# Patient Record
Sex: MALE | Race: WHITE
[De-identification: names, ages, dates, MRNs, and addresses within clinical notes are randomized per-mention and may not be internally consistent; named-entity substitution may affect disease eponyms.]

---

## 2022-11-29 ENCOUNTER — HOSPITAL ENCOUNTER (OUTPATIENT)
Dept: HOSPITAL 75 - CSD | Age: 67
Setting detail: OBSERVATION
LOS: 4 days | Discharge: HOME | End: 2022-12-03
Attending: INTERNAL MEDICINE | Admitting: INTERNAL MEDICINE
Payer: MEDICARE

## 2022-11-29 VITALS — HEIGHT: 74.8 IN | BODY MASS INDEX: 28.39 KG/M2 | WEIGHT: 225.97 LBS

## 2022-11-29 DIAGNOSIS — I21.4: ICD-10-CM

## 2022-11-29 DIAGNOSIS — I25.10: Primary | ICD-10-CM

## 2022-11-29 DIAGNOSIS — E78.5: ICD-10-CM

## 2022-11-29 DIAGNOSIS — U07.1: ICD-10-CM

## 2022-11-29 DIAGNOSIS — I47.1: ICD-10-CM

## 2022-11-29 DIAGNOSIS — I10: ICD-10-CM

## 2022-11-29 PROCEDURE — 80061 LIPID PANEL: CPT

## 2022-11-29 PROCEDURE — 84484 ASSAY OF TROPONIN QUANT: CPT

## 2022-11-29 PROCEDURE — 85025 COMPLETE CBC W/AUTO DIFF WBC: CPT

## 2022-11-29 PROCEDURE — 80053 COMPREHEN METABOLIC PANEL: CPT

## 2022-11-29 PROCEDURE — 93306 TTE W/DOPPLER COMPLETE: CPT

## 2022-11-29 PROCEDURE — 80048 BASIC METABOLIC PNL TOTAL CA: CPT

## 2022-11-29 PROCEDURE — 84443 ASSAY THYROID STIM HORMONE: CPT

## 2022-11-29 PROCEDURE — 36415 COLL VENOUS BLD VENIPUNCTURE: CPT

## 2022-11-29 PROCEDURE — 85027 COMPLETE CBC AUTOMATED: CPT

## 2022-11-29 PROCEDURE — 93458 L HRT ARTERY/VENTRICLE ANGIO: CPT

## 2022-11-29 PROCEDURE — 96361 HYDRATE IV INFUSION ADD-ON: CPT

## 2022-11-29 PROCEDURE — 83735 ASSAY OF MAGNESIUM: CPT

## 2022-11-29 PROCEDURE — 93005 ELECTROCARDIOGRAM TRACING: CPT

## 2022-11-30 VITALS — DIASTOLIC BLOOD PRESSURE: 73 MMHG | SYSTOLIC BLOOD PRESSURE: 124 MMHG

## 2022-11-30 VITALS — DIASTOLIC BLOOD PRESSURE: 80 MMHG | SYSTOLIC BLOOD PRESSURE: 123 MMHG

## 2022-11-30 VITALS — SYSTOLIC BLOOD PRESSURE: 139 MMHG | DIASTOLIC BLOOD PRESSURE: 77 MMHG

## 2022-11-30 VITALS — DIASTOLIC BLOOD PRESSURE: 77 MMHG | SYSTOLIC BLOOD PRESSURE: 121 MMHG

## 2022-11-30 LAB
ALBUMIN SERPL-MCNC: 3.8 GM/DL (ref 3.2–4.5)
ALP SERPL-CCNC: 76 U/L (ref 40–136)
ALT SERPL-CCNC: 21 U/L (ref 0–55)
BILIRUB SERPL-MCNC: 0.9 MG/DL (ref 0.1–1)
BUN/CREAT SERPL: 22
CALCIUM SERPL-MCNC: 8.9 MG/DL (ref 8.5–10.1)
CHLORIDE SERPL-SCNC: 112 MMOL/L (ref 98–107)
CHOLEST SERPL-MCNC: 122 MG/DL (ref ?–200)
CO2 SERPL-SCNC: 18 MMOL/L (ref 21–32)
CREAT SERPL-MCNC: 0.91 MG/DL (ref 0.6–1.3)
GFR SERPLBLD BASED ON 1.73 SQ M-ARVRAT: 92 ML/MIN
GLUCOSE SERPL-MCNC: 117 MG/DL (ref 70–105)
HCT VFR BLD CALC: 48 % (ref 40–54)
HDLC SERPL-MCNC: 27 MG/DL (ref 40–60)
HGB BLD-MCNC: 15.8 G/DL (ref 13.3–17.7)
MAGNESIUM SERPL-MCNC: 1.9 MG/DL (ref 1.6–2.4)
MCH RBC QN AUTO: 31 PG (ref 25–34)
MCHC RBC AUTO-ENTMCNC: 33 G/DL (ref 32–36)
MCV RBC AUTO: 94 FL (ref 80–99)
PLATELET # BLD: 183 10^3/UL (ref 130–400)
PMV BLD AUTO: 10.9 FL (ref 9–12.2)
POTASSIUM SERPL-SCNC: 4 MMOL/L (ref 3.6–5)
PROT SERPL-MCNC: 6.1 GM/DL (ref 6.4–8.2)
SODIUM SERPL-SCNC: 144 MMOL/L (ref 135–145)
TRIGL SERPL-MCNC: 157 MG/DL (ref ?–150)
VLDLC SERPL CALC-MCNC: 31 MG/DL (ref 5–40)
WBC # BLD AUTO: 12.7 10^3/UL (ref 4.3–11)

## 2022-11-30 RX ADMIN — APIXABAN SCH MG: 5 TABLET, FILM COATED ORAL at 20:58

## 2022-11-30 RX ADMIN — NIRMATRELVIR AND RITONAVIR SCH EACH: KIT at 20:58

## 2022-11-30 NOTE — CONSULTATION-CARDIOLOGY
HPI-Cardiology


Cardiology Consultation:


Date of Consultation


11/30/22


Time Seen by a Provider:  13:00


Date of Admission





Attending Physician


Wesly Juares DO


Admitting Physician


Admitting Physician:


Nile Herrmann MD 








Attending Physician:


Nile Herrmann MD


Consulting Physician


DILLAN GRUBER MD, MA, FACP, FACC, [facility], CCDS





HPI:


Chief Complaint:


Syncope 


Atrial tachycardia vs atrial flutter with 2:1 conduction


Mr. Jennings is a 67 yr old male admitted to [facility] from [facility] d/t syncopal 

episodes x 2 at home.  He reports he got up yesterday to use the bathroom.  He 

states he was having palpitations and the next thing he recalls he had passed 

out hitting his head on the wall.  He reports he came to when he hit the floor. 

He reports he then decided to go sit down once he got up.  He was still having 

palpitations and he again passed out while sitting coming to when he hit the 

floor.  He does not report any loss of bowel or bladder control.  He states 

after he came to the second time he could still feel his heart racing. He denies

any CP or SOB.  He denies having any LE swelling.  He states in the ED at [facility] 

they found his HR to be in the 130's-140's.  He states he received a medication 

in the ED and the palpitations resolved and have not returned.  He states he has

not had any n/v/d.  No c/o fever or chills.  He reports he was swabbed on his 

way out the door from [place] to be transferred here and found to be COVID 

positive.





Review of Systems-Cardiology


Review of Systems


Constitutional:  No chills, No fever, No malaise


Eyes:  No vision change


Ears/Nose/Throat:  No epistaxis, No recent hearing loss, No ulcerations


Respiratory:  As described under HPI


Cardiovascular:  As described under HPI


Gastrointestinal:  No constipation, No diarrhea, No nausea, No vomiting


Genitourinary:  No dysuria, No hematuria


Musculoskeletal:  no symptoms reported


Skin:  No rash on exposed areas, No ulcerations on exposed areas


Psychiatric/Neurological:  syncope; No anxiety, No depression, No seizure, No 

focal weakness


Hematologic:  No bleeding abnormalities





PMH-Social-Family Hx


Patient Social History


Marrital Status:  


Have you traveled recently?:  No


Alcohol Use?:  No


Pt feels they are or have been:  No





Past Medical History


PMH


As described under Assessment.





Family Medical History


Family Medical History:  


He reports his father had "heart trouble", exact details unknown other


than he had a pacemaker.





Allergies and Home Medications


Allergies


Coded Allergies:  


     No Known Drug Allergies (Unverified , 11/30/22)





Patient Home Medication List


Home Medication List Reviewed:  Yes


Aspirin (Aspirin EC) 81 Mg Tablet.dr, 81 MG PO DAILY, (Reported)


   Entered as Reported by: WU MORA on 11/30/22 1325


   Last Action: Held


Carvedilol (Carvedilol) 12.5 Mg Tablet, 12.5 MG PO BID, (Reported)


   Entered as Reported by: WU MORA on 11/30/22 1325


   Last Action: Held


Lisinopril (Lisinopril) 10 Mg Tablet, 10 MG PO DAILY, (Reported)


   Entered as Reported by: WU MORA on 11/30/22 1325


   Last Action: Held


Multivitamin (Multivitamin) 1 Each Tablet, 1 EACH PO DAILY, (Reported)


   Entered as Reported by: WU MORA on 11/30/22 1325


   Last Action: Converted


Simvastatin (Simvastatin) 40 Mg Tablet, 40 MG PO HS, (Reported)


   Entered as Reported by: WU MORA on 11/30/22 1325


   Last Action: Converted


Tamsulosin HCl (Flomax) 0.4 Mg Cap, 0.4 MG PO HS, (Reported)


   Entered as Reported by: WU MORA on 11/30/22 1325


   Last Action: Continued





Physical Exam-Cardiology


Physical Exam


Vital Signs/I&O











 11/30/22 11/30/22 11/30/22 11/30/22





 07:00 08:00 08:00 11:50


 


Temp   36.3 36.1


 


Pulse 60  60 64


 


Resp   18 16


 


B/P (MAP)   121/77 (92) 139/77 (97)


 


Pulse Ox   95 96


 


O2 Delivery  Room Air Room Air Room Air


 


    





 11/30/22 11/30/22  





 13:00 16:12  


 


Temp  36.7  


 


Pulse 69 58  


 


Resp  18  


 


B/P (MAP)  124/73 (90)  


 


Pulse Ox  97  


 


O2 Delivery  Room Air  





Capillary Refill :


Constitutional:  AAO x 3, well-developed, well-nourished


HEENT:  PERRL, hearing is well preserved, oral hygience is good


Neck:  No carotid bruit; carotid pulses are 2 + bilaterally


Respiratory:  No accessory muscle use, No respiratory distress; chest expansion 

is symmetric, chest is bilaterally symmetric, lungs clear to auscultation


Cardiovascular:  regular rate-rhythm; No JVD; S1 and S2


Gastrointestinal:  No tender; soft, round, audible bowel sounds


Extremities:  no lower extremity edema bilateral


Neurologic/Psychiatric:  grossly intact (moves all extremities)


Skin:  No rash on exposed areas, No ulcerations on exposed areas





Data Review


Labs


Laboratory Tests


11/30/22 07:20: 


White Blood Count 12.7H, Red Blood Count 5.11, Hemoglobin 15.8, Hematocrit 48, 

Mean Corpuscular Volume 94, Mean Corpuscular Hemoglobin 31, Mean Corpuscular 

Hemoglobin Concent 33, Red Cell Distribution Width 12.9, Platelet Count 183, 

Mean Platelet Volume 10.9, Sodium Level 144, Potassium Level 4.0, Chloride Level

112H, Carbon Dioxide Level 18L, Anion Gap 14, Blood Urea Nitrogen 20H, 

Creatinine 0.91, Estimat Glomerular Filtration Rate 92, BUN/Creatinine Ratio 22,

Glucose Level 117H, Calcium Level 8.9, Corrected Calcium 9.1, Magnesium Level 

1.9, Total Bilirubin 0.9, Aspartate Amino Transf (AST/SGOT) 23, Alanine 

Aminotransferase (ALT/SGPT) 21, Alkaline Phosphatase 76, Troponin I 0.095H, 

Total Protein 6.1L, Albumin 3.8, Triglycerides Level 157H, Cholesterol Level 

122, LDL Cholesterol Direct 72, VLDL Cholesterol 31, HDL Cholesterol 27L, 

Thyroid Stimulating Hormone (TSH) 2.61


11/30/22 14:35: Troponin I 0.057H








A/P-Cardiology


Assessment/Admission Diagnosis


Syncope


- undetermined etiology: arrhythmia vs postural hypotension (during acute Covid)

on 11/29/22





Atrial tachycardia vs atrial flutter with 2:1 AV conduction of EKG of 11-29-22 

at [facility]





Minimal troponin elevation


- likely Type 2 MI secondary to tachycardia





CAD


- h/o stents x 1 at [facility] by Dr. Méndez approx 15 yrs ago





HTN





HLD


- statin tx





Discussion and Recomendations





Syncope x 2 of undetermined etiology


- keep on tele


- consider implant of ILR as out pt if no cause is found on tele


Atrial tachycardia vs atrial flutter with 2:1 AV conduction


- start on OAC for stroke prophylaxis


- start on Cardizem CD 240mg daily


Minimal troponin elevation


- likely Type 2 MI secondary to tachycardia


EKG today


Echocardiogram today


Lab


Replace electrolytes as indicated


Request records


Start ASA 81 mg daily d/t known h/o CAD with stent in the past


Further recs will be based on his hospital course


I had a long and detailed discussion with him regarding his CV issues and our 

management plan. I answered his questions in detail











DILLAN GRUBER MD FACP FAC CCDS   Nov 30, 2022 17:10

## 2022-11-30 NOTE — CONSULTATION-CARDIOLOGY
HPI-Cardiology


Cardiology Consultation:


Date of Consultation


11/30/22


Time Seen by a Provider:  09:00


Date of Admission


11-30-22


Attending Physician


Wesly Juares DO


Admitting Physician


Admitting Physician:


Nile Herrmann MD 








Attending Physician:


Nile Herrmann MD


Consulting Physician


ADRIAN CHANCE





HPI:


Chief Complaint:


Syncope 


Atrial tachycardia vs atrial flutter with 2:1 conduction


Mr. Jennings is a 67 yr old male admitted to [facility] from [facility] d/t syncopal 

episodes x 2 at home.  He reports he got up yesterday to use the bathroom.  He 

states he was having palpitations and the next thing he recalls he had passed 

out hitting his head on the wall.  He reports he came to when he hit the floor. 

He reports he then decided to go sit down once he got up.  He was still having 

palpitations and he again passed out while sitting coming to when he hit the 

floor.  He does not report any loss of bowel or bladder control.  He states 

after he came to the second time he could still feel his heart racing. He denies

any CP or SOB.  He denies having any LE swelling.  He states in the ED at [facility] 

they found his HR to be in the 130's-140's.  He states he received a medication 

in the ED and the palpitations resolved and have not returned.  He states he has

not had any n/v/d.  No c/o fever or chills.  He reports he was swabbed on his 

way out the door from [place] to be transferred here and found to be COVID 

positive.





Review of Systems-Cardiology


Review of Systems


Constitutional:  No chills, No fever, No malaise


Eyes:  No vision change


Ears/Nose/Throat:  No epistaxis, No recent hearing loss, No ulcerations


Respiratory:  As described under HPI


Cardiovascular:  As described under HPI


Gastrointestinal:  No constipation, No diarrhea, No nausea, No vomiting


Genitourinary:  No dysuria, No hematuria


Musculoskeletal:  no symptoms reported


Skin:  No rash on exposed areas, No ulcerations on exposed areas


Psychiatric/Neurological:  syncope; No anxiety, No depression, No seizure, No 

focal weakness


Hematologic:  No bleeding abnormalities





PMH-Social-Family Hx


Patient Social History


Have you traveled recently?:  No


Alcohol Use?:  No


Pt feels they are or have been:  No





Past Medical History


PMH


As described under Assessment.





Family Medical History


Family Medical History:  


He reports his father had "heart trouble", exact details unknown other


than he had a pacemaker.





Allergies and Home Medications


Allergies


Coded Allergies:  


     No Known Drug Allergies (Unverified , 11/30/22)





Patient Home Medication List


Aspirin (Aspirin EC) 81 Mg Tablet.dr, 81 MG PO DAILY, (Reported)


   Entered as Reported by: WU MORA on 11/30/22 1325


   Last Action: Held


Carvedilol (Carvedilol) 12.5 Mg Tablet, 12.5 MG PO BID, (Reported)


   Entered as Reported by: WU MORA on 11/30/22 1325


   Last Action: Held


Lisinopril (Lisinopril) 10 Mg Tablet, 10 MG PO DAILY, (Reported)


   Entered as Reported by: WU MORA on 11/30/22 1325


   Last Action: Held


Multivitamin (Multivitamin) 1 Each Tablet, 1 EACH PO DAILY, (Reported)


   Entered as Reported by: WU MORA on 11/30/22 1325


   Last Action: Converted


Simvastatin (Simvastatin) 40 Mg Tablet, 40 MG PO HS, (Reported)


   Entered as Reported by: WU MORA on 11/30/22 1325


   Last Action: Converted


Tamsulosin HCl (Flomax) 0.4 Mg Cap, 0.4 MG PO HS, (Reported)


   Entered as Reported by: WU MORA on 11/30/22 1325


   Last Action: Continued





Physical Exam-Cardiology


Physical Exam


Vital Signs/I&O











 12/1/22 12/1/22 12/1/22 12/1/22





 00:00 01:00 04:14 07:00


 


Temp 37.0  36.3 


 


Pulse 59 50 63 50


 


Resp 15  17 


 


B/P (MAP) 117/76 (90)  124/75 (91) 


 


Pulse Ox 97  98 


 


O2 Delivery Room Air  Room Air 


 


    





 12/1/22   





 08:00   


 


Temp 36.2   


 


Pulse 69   


 


Resp 16   


 


B/P (MAP) 125/79 (94)   


 


Pulse Ox 97   


 


O2 Delivery Room Air   














 12/1/22





 00:00


 


Intake Total 730 ml


 


Balance 730 ml





Capillary Refill :


Constitutional:  AAO x 3, well-developed, well-nourished


HEENT:  PERRL, hearing is well preserved, oral hygience is good


Neck:  No carotid bruit; carotid pulses are 2 + bilaterally


Respiratory:  No accessory muscle use, No respiratory distress; chest expansion 

is symmetric, chest is bilaterally symmetric, lungs clear to auscultation


Cardiovascular:  regular rate-rhythm; No JVD; S1 and S2


Gastrointestinal:  No tender; soft, round, audible bowel sounds


Extremities:  no lower extremity edema bilateral


Neurologic/Psychiatric:  grossly intact (moves all extremities)


Skin:  No rash on exposed areas, No ulcerations on exposed areas





Data Review


Labs


Laboratory Tests


11/30/22 14:35: Troponin I 0.057H


12/1/22 08:35: 


Sodium Level 141, Potassium Level 4.1, Chloride Level 107, Carbon Dioxide Level 

20L, Anion Gap 14, Blood Urea Nitrogen 15, Creatinine 0.94, Estimat Glomerular 

Filtration Rate 88, BUN/Creatinine Ratio 16, Glucose Level 137H, Calcium Level 

9.8, Magnesium Level 1.8








ECG Impression


ECG


Comment


EKG from [facility]:  Atrial tachycardia vs atrial flutter with 2:1 conduction





A/P-Cardiology


Assessment/Admission Diagnosis


Syncope


- undetermined etiology





Atrial tachycardia vs atrial flutter with 2:1 AV conduction of EKG of 11-29-22 

at [facility]





Minimal troponin elevation


- likely Type 2 MI secondary to tachycardia





CAD


- h/o stents x 1 at [facility] by Dr. Méndez approx 15 yrs ago





HTN





HLD


- statin tx





Discussion and Recomendations


Syncope x 2 of undetermined etiology


- keep on tele


- consider implant of ILR as out pt if no cause is found on tele


Atrial tachycardia vs atrial flutter with 2:1 AV conduction


- start on OAC for stroke prophylaxis


- start on Cardizem CD 240mg daily


Minimal troponin elevation


- likely Type 2 MI secondary to tachycardia


EKG today


Echocardiogram today


Lab


Replace electrolytes as indicated


Request records


Start ASA 81 mg daily d/t known h/o CAD with stent in the past


Consider MPI


Further recs will be based on his hospital course


We would like to thank medical services for this consult











ADRIAN CAMPBELL           Nov 30, 2022 09:43

## 2022-11-30 NOTE — HISTORY & PHYSICAL-HOSPITALIST
History of Present Illness


HPI/Chief Complaint


Patient is a 67-year-old  male with past medical history of coronary 

artery disease, hypertension, hyperlipidemia, who presented to the emergency 

department at [facility] due to syncope.  He states he had a

normal day yesterday and was walking in his room and passed out.  He was in the 

bathroom when he woke up sat down and then passed out again.  His wife witnessed

this and states that he was unconscious for less than a minute.  On arrival to 

the emergency department he was found to be quite tachycardic in the 160s.  The 

ER physician there consulted with [facility] who recommended digoxin and

adenosine.  He appeared to have an underlying atrial flutter and laboratory 

work-up revealed an elevated troponin.  He was transferred here for further 

evaluation.  On my exam he has no complaints and is in sinus rhythm. He was 

incidentally found to be COVID positive upon transfer but denies any symptoms.


Source:  patient


Date Seen


11/30/22


Time Seen by a Provider:  09:04


Attending Physician


Wesly Juares DO


PCP


Admitting Physician:


Nile Herrmann MD 








Attending Physician:


Nile Herrmann MD


Referring Physician





Date of Admission


Nov 30, 2022 at 01:24





Home Medications & Allergies


Home Medications


Reviewed patient Home Medication Reconciliation performed by pharmacy medication

reconciliations technician and/or nursing.


Patients Allergies have been reviewed.





Allergies





Allergies


Coded Allergies


  No Known Drug Allergies ([id number]/30/22)








Past Medical-Social-Family Hx


Patient Social History


Marrital Status:  


Tobacco Use?:  No


Smokeless Tobacco Frequency:  Never a User


Use of E-Cig and/or Vaping dev:  No


Substance use?:  No


Alcohol Use?:  No


Pt feels they are or have been:  No





Immunizations Up To Date


First/Initial COVID19 Vaccinat:  y


Second COVID19 Vaccination Des:  y- but not boosted





Current Status


Advance Directives:  No


Communicates:  Verbally


Primary Language:  English


Preferred Spoken Language:  English


Is interpretation needed?:  No


Implanted or Applied Medical D:  None





Past Medical History


Coronary Artery Disease, High Cholesterol, Hypertension


Benign Prostatic Hyperpl


Diabetes, Non-Insulin dep





Review of Systems


Constitutional:  No chills, No fever


EENTM:  No nose congestion, No throat pain


Respiratory:  No cough, No dyspnea on exertion, No short of breath


Cardiovascular:  No edema; Hx of Intervention, syncope


Gastrointestinal:  no symptoms reported


Genitourinary:  no symptoms reported


Musculoskeletal:  no symptoms reported





Physical Exam


Physical Exam


Vital Signs





Vital Signs - First Documented








 11/30/22 11/30/22 11/30/22





 01:30 01:48 08:00


 


Temp   36.3


 


Pulse  65 


 


Resp   18


 


B/P (MAP)   121/77 (92)


 


Pulse Ox 93  


 


O2 Delivery Room Air  





Capillary Refill :


Height, Weight, BMI


Height: '"


Weight: lbs. oz. kg; 28.74 BMI


Method:


General Appearance:  No Apparent Distress, WD/WN


HEENT:  PERRL/EOMI, Moist Mucous Membranes; No Scleral Icterus (L), No Scleral 

Icterus (R)


Neck:  Normal Inspection, Supple


Respiratory:  Lungs Clear, No Accessory Muscle Use, No Respiratory Distress


Cardiovascular:  Regular Rate, Rhythm, No JVD, No Murmur


Gastrointestinal:  Normal Bowel Sounds, Non Tender, Soft


Extremity:  Normal Capillary Refill, No Calf Tenderness


Neurologic/Psychiatric:  Alert, Oriented x3, Normal Mood/Affect; No Aphasia, No 

Facial Droop





Results


Results/Procedures


Labs


Laboratory Tests


11/30/22 07:20








Patient resulted labs reviewed.





Assessment/Plan


Admission Diagnosis


NSTEMI


Admission Status:  Observation


Reason for Inpatient Admission:  


see below





Assessment and Plan


NSTEMI


Atrial tachycardia


CAD


HTN


HLD


   Trend troponins


   Received ASA and Heparin at OSH


   Cardiology consulted, appreciate recs


   Discussed with SHIMON Stone for Dr Vega and they recommend Eliquis for likely

underlying atrial flutter


   Echo ordered


   Continue home meds as appropriate


   


COVID


   Incidental finding


   Higher risk given age and comorbidities


   Pt agreeable to Paxlovid


   On room air- not admitted for COVID





BPD


   Resume home Flomax





DVT ppx: Lovenox





Diagnosis/Problems


Diagnosis/Problems





(1) Atrial tachycardia


Status:  Acute


(2) CAD (coronary artery disease)


Status:  Chronic


Qualifiers:  


   Coronary Disease-Associated Artery/Lesion type:  native artery  Native vs. 

transplanted heart:  native heart  Associated angina:  without angina  Qualified

Codes:  I25.10 - Atherosclerotic heart disease of native coronary artery without

angina pectoris


(3) Essential (primary) hypertension


Status:  Chronic


(4) HLD (hyperlipidemia)


Status:  Chronic


Qualifiers:  


   Hyperlipidemia type:  mixed hyperlipidemia  Qualified Codes:  E78.2 - Mixed 

hyperlipidemia


(5) Syncope


Status:  Acute


Qualifiers:  


   Syncope type:  unspecified  Qualified Codes:  R55 - Syncope and collapse











GAYLE MELTON MD             Nov 30, 2022 9:04 am

## 2022-11-30 NOTE — XMS REPORT
Clinical Summary

                             Created on: 2022



Bal Jennings

External Reference #: [id number]

: 1955

Sex: Male



Demographics





                          Address                   [address] [place] [address]  [address]

 

                          Home Phone                [phone]

 

                          Preferred Language        English

 

                          Marital Status            

 

                          Roman Catholic Affiliation     Unknown

 

                          Race                      White

 

                          Ethnic Group              Not  or 





Author





                          Author                    [facility]

 

                          Organization              [facility]

 

                          Address                   Unknown

 

                          Phone                     Unavailable







Support





                Name            Relationship    Address         Phone

 

                Vivian Jennings ECON            Unknown         [phone]







Care Team Providers





                    Care Team Member Name Role                Phone

 

                    Wesly Juares MD    PCP                 [phone]

 

                    Schoenfeld, Roger DO Unavailable         [phone]







Source Comments

Some departments are not documenting in the electronic medical record.  If you d
o not see the information that you expected, contact Release of Information in [facility] Information Management department at [phone] for further assistan
ce in locating additional records.[facility]



Allergies

No known active allergies



Medications





                          End Date                  Status



              Medication   Sig          Dispensed    Refills      Start  



                                         Date  

 

                                                    Active



                     aspirin-calcium carbonate  Take 81 mg by       0   



                           81 mg-300 mg calcium(777  mouth daily.     



                                         mg) tab      

 

                                                    Active



                 carvediloL (COREG) 12.5  Take 12.5 mg    0               /

  



                     mg tablet           by mouth            0  



                                         twice daily.     

 

                                                    Active



                 lisinopriL (ZESTRIL) 10  Take 10 mg by   0               /

  



                     mg tablet           mouth daily.        0  

 

                                                    Active



                 simvastatin (ZOCOR) 40 mg  Take 40 mg by   0               /2

  



                     tablet              mouth daily.        0  

 

                                                    Active



                     folic               Take 1 tablet       0   



                           acid/multivit-min/lutein  by mouth     



                           (CENTRUM SILVER PO)       daily.     

 

                                                    Active



                     zinc sulfate 220 mg (50  Take 220 mg         0   



                           mg elemental zinc)        by mouth     



                           capsule                   daily.     

 

                                                    Active



              tamsulosin (FLOMAX) 0.4  Take one     90 capsule   3              



                     mg capsuleIndications:  capsule by          1  



                           Benign prostatic          mouth daily.     



                           hyperplasia with lower    Take 30 min     



                           urinary tract symptoms,   after same     



                           symptom details           meal.  Do not     



                           unspecified               cut/ crush/     



                                         chew.     







Active Problems





 



                           Problem                   Noted Date

 

 



                           Benign prostatic hyperplasia (BPH)  2021

 

 



                                         Overview: Formatting of this note might

 be different from the original.



                                         MRI Prostate (2020): Prostate vol

ume = 36 mL.



                                         MRI Prostate (2022): Prostate vol

ume = 46 mL.





                                         Last Assessment & Plan: Formatting of t

his note might be different from the



                                         original.



                                         Continue Tamsulosin 0.4 mg BID.

 

 



                           Prostate cancer           2020

 

 



                                         Overview: Formatting of this note might

 be different from the original.



                                         PSA (2020) = 5.6 ng/mL.



                                         TRUS Prostate (2020): Prostate vo

lume = 36.34 mL.  PSAD = 0.15



                                         ng/mL^2.



                                         Prostate bx (2020): cT1c; Low-ris

k adenocarcinoma; Dr. Schoenfeld.



                                         -- (R) Grade group 1, 2/8 cores, 5%;



                                         MRI Prostate (2020): Prostate vol

ume = 36 mL.



                                         -- No evidence to suggest high-grade pr

ostate carcinoma.



                                         Prostate bx (2021): No evidence o

f malignancy; GUILLERMINA Steward.



                                         -- (R) high-grade prostatic intraepithe

lial neoplasia (HG PIN), 2/6 cores.



                                         MRI Prostate (2022): Prostate vol

ume = 46 mL.  PSAD = 0.12 ng/mL^2.



                                         -- (R) posterolateral midgland peripher

al zone lesion, 1.2 cm. PI-RADS 3 vs



                                         prior prostatitis.





                                         Last Assessment & Plan: Formatting of t

his note might be different from the



                                         original.



                                         PSA reviewed --> stable.



                                         MRI reviewed --> stable.



                                         F/u 6 mo Telemed appt w/ outside PSA ~ 

1-4 wks prior to appt.

 

 



                                         Hypertension, essential 

 

 



                                         Dyslipidemia 

 

 



                                         Nocturia 

 

 



                                         Last Assessment & Plan: Formatting of t

his note might be different from the



                                         original.



                                         See BPH A&P note.







Surgical History





   



                 Surgery         Date            Site/Laterality  Comments

 

   



                                         HX HEART CATHETERIZATION   

 

   



                                         SKIN CANCER EXCISION   







Medical History





  



                     Medical History     Date                Comments

 

  



                           Skin cancer               Back

 

  



                           Prostate cancer (HCC)     2020 

 

  



                                         Hypertension, essential  

 

  



                                         Dyslipidemia  







Family History





   



                 Medical History  Relation        Name            Comments

 

   



                           Cancer-Prostate           Neg Hx  







Social History





                                        Date



                 Tobacco Use     Types           Packs/Day       Years Used 

 

                                         



                                         Smoking Tobacco: Never    

 

                                         



                                         Smokeless Tobacco: Never    







                                        Comments



                           Alcohol Use               Standard Drinks/Week 

 

                                         



                           Not Currently             0 (1 standard drink = 0.6 o

z pure alcohol) 







 



                           Sex Assigned at Birth     Date Recorded

 

 



                           Male                      2021  3:20 PM CDT







Obstetrics History





Last Filed Vital Signs





                    Reading             Time Taken          Comments



                                         Vital Sign   

 

                    127/71              2022  1:23 PM CDT  



                                         Blood Pressure   

 

                    77                  2022  1:23 PM CDT  



                                         Pulse   

 

                    36.4 C (97.5 F) 2021  1:37 PM CDT  



                                         Temperature   

 

                    18                  2022  1:23 PM CDT  



                                         Respiratory Rate   

 

                    -                   -                    



                                         Oxygen Saturation   

 

                    -                   -                    



                                         Inhaled Oxygen   



                                         Concentration   

 

                    104.3 kg (230 lb)   2022  1:23 PM CDT  



                                         Weight   

 

                    190.5 cm (6' 3")    2022  1:23 PM CDT  



                                         Height   

 

                    28.75               2022  1:23 PM CDT  



                                         Body Mass Index   







Plan of Treatment





   



                 Health Maintenance  Due Date        Last Done       Comments

 

   



                           MEDICARE ANNUAL WELLNESS  1955  



                                         VISIT   

 

   



                           DTAP/TDAP VACCINES (1 -   1972  



                                         Tdap)   

 

   



                           HEPATITIS C SCREENING     1972  

 

   



                           PHYSICAL (COMPREHENSIVE)  1972  



                                         EXAM   

 

   



                           COLORECTAL CANCER         1999  



                                         SCREENING   

 

   



                           SHINGLES RECOMBINANT      2004  



                                         VACCINE (1 of 2)   

 

   



                           PNEUMOCOCCAL VACCINE (1 -  2019  



                                         PCV)   

 

   



                     COVID-19 VACCINE (3 -  2021, 



                           Booster for Moderna       2021 



                                         series)   

 

   



                           ADVANCED CARE PLANNING    2022  



                                         DISCUSSION AND   



                                         DOCUMENTATION   

 

   



                           DEPRESSION SCREENING      2022  

 

   



                     INFLUENZA VACCINE   2022 







Results

Not on filefrom Last 3 Months



Insurance





                                        Type



            Payer      Benefit    Subscriber ID  Effective  Phone      Address 



                           Plan /                    Dates   



                                         Group     

 

                                        Medicare



            MEDICARE   MEDICARE   [id number]  2019-P  [phone]  [address] 



                     [address]          [address]              [id number] 



                           [address]                         [place], [place] 



                                         [address] 

 

                                        PPO



            BCBS SYLVIA    [employer]    [id number]  2019-P  [phone]  [address]

 



                     OUT OF              resent              [id number] 



                           [place] 



                                         [address] 







     



            Guarantor Name  Account    Relation to  Date of    Phone      Master

g Address



                     Type                Patient             Birth  

 

     



            Bal Jennings  Personal/F  Self       1955  [id number]

[address] [facility]               (Home)              [place], [place]



                                         [address]







Care Teams





                          Start Date                End Date



                     Team Member         Relationship        Specialty  

 

                          4/1/20                     



                     Wesly Juares MD   PCP - General       Family  



                           [address] [place] [address]    



                                         [phone] (Work)    



                                         [phone] (Fax)    

 

                          20                     



                           Schoenfeld, Roger, DO     Urology  



                                         [address] [address]    



                                         [phone] (Work)    



                                         [phone] (Fax)

## 2022-12-01 VITALS — DIASTOLIC BLOOD PRESSURE: 77 MMHG | SYSTOLIC BLOOD PRESSURE: 120 MMHG

## 2022-12-01 VITALS — SYSTOLIC BLOOD PRESSURE: 124 MMHG | DIASTOLIC BLOOD PRESSURE: 75 MMHG

## 2022-12-01 VITALS — DIASTOLIC BLOOD PRESSURE: 75 MMHG | SYSTOLIC BLOOD PRESSURE: 146 MMHG

## 2022-12-01 VITALS — SYSTOLIC BLOOD PRESSURE: 126 MMHG | DIASTOLIC BLOOD PRESSURE: 81 MMHG

## 2022-12-01 VITALS — SYSTOLIC BLOOD PRESSURE: 125 MMHG | DIASTOLIC BLOOD PRESSURE: 79 MMHG

## 2022-12-01 VITALS — SYSTOLIC BLOOD PRESSURE: 115 MMHG | DIASTOLIC BLOOD PRESSURE: 69 MMHG

## 2022-12-01 LAB
BUN/CREAT SERPL: 16
CALCIUM SERPL-MCNC: 9.8 MG/DL (ref 8.5–10.1)
CHLORIDE SERPL-SCNC: 107 MMOL/L (ref 98–107)
CO2 SERPL-SCNC: 20 MMOL/L (ref 21–32)
CREAT SERPL-MCNC: 0.94 MG/DL (ref 0.6–1.3)
GFR SERPLBLD BASED ON 1.73 SQ M-ARVRAT: 88 ML/MIN
GLUCOSE SERPL-MCNC: 137 MG/DL (ref 70–105)
MAGNESIUM SERPL-MCNC: 1.8 MG/DL (ref 1.6–2.4)
POTASSIUM SERPL-SCNC: 4.1 MMOL/L (ref 3.6–5)
SODIUM SERPL-SCNC: 141 MMOL/L (ref 135–145)

## 2022-12-01 RX ADMIN — APIXABAN SCH MG: 2.5 TABLET, FILM COATED ORAL at 21:30

## 2022-12-01 RX ADMIN — Medication SCH EA: at 06:55

## 2022-12-01 RX ADMIN — NIRMATRELVIR AND RITONAVIR SCH EACH: KIT at 21:31

## 2022-12-01 RX ADMIN — ASPIRIN 81 MG CHEWABLE TABLET SCH MG: 81 TABLET CHEWABLE at 09:53

## 2022-12-01 RX ADMIN — APIXABAN SCH MG: 5 TABLET, FILM COATED ORAL at 09:53

## 2022-12-01 RX ADMIN — NIRMATRELVIR AND RITONAVIR SCH EACH: KIT at 09:53

## 2022-12-01 NOTE — PROGRESS NOTE - CARDIOLOGY
Cardiology SOAP Progress Note


Subjective:


No recurrence of syncope


Shortness of breath better


Gen malaise and weakness improving


No n/v/d


No focal weakness


No cp or palp





Objective:


I&O/Vital Signs











 12/1/22 12/1/22 12/1/22 12/1/22





 07:00 08:00 08:00 12:00


 


Temp   36.2 36.0


 


Pulse 50  69 70


 


Resp   16 16


 


B/P (MAP)   125/79 (94) 126/81 (96)


 


Pulse Ox   97 99


 


O2 Delivery  Room Air Room Air Room Air


 


    





 12/1/22   





 13:00   


 


Pulse 58   














 11/30/22





 23:59


 


Intake Total 730 ml


 


Balance 730 ml








Constitutional:  AAO x 3, well-developed, well-nourished


Respiratory:  No accessory muscle use, No respiratory distress; chest expansion 

is symmetric, chest is bilaterally symmetric, lungs clear to auscultation


Cardiovascular:  regular rate-rhythm; No JVD; S1 and S2


Gastrointestional:  No tender; soft, round, audible bowel sounds


Extremities:  no lower extremity edema bilateral


Neurologic/Psychiatric:  grossly intact (moves all extremities)


Skin:  No rash on exposed areas, No ulcerations on exposed areas





Results/Procedures:


Labs


Laboratory Tests


12/1/22 08:35: 


Sodium Level 141, Potassium Level 4.1, Chloride Level 107, Carbon Dioxide Level 

20L, Anion Gap 14, Blood Urea Nitrogen 15, Creatinine 0.94, Estimat Glomerular 

Filtration Rate 88, BUN/Creatinine Ratio 16, Glucose Level 137H, Calcium Level 

9.8, Magnesium Level 1.8








A/P:


Assessment:


Syncope


- undetermined etiology: arrhythmia vs postural hypotension (during acute Covid)

on 11/29/22


- Echo on 12/1/22: LVEF 35-40%, josé-apical hypokinesis to akinesis





Atrial tachycardia vs atrial flutter with 2:1 AV conduction of EKG of 11-29-22 

at [facility]





Abnormal ECG: NSR with nonspecific IVCD





Minimal troponin elevation


- likely Type 2 MI secondary to tachycardia





CAD


- h/o stents x 1 at [facility] by Dr. Méndez approx 15 yrs ago





ICM


- Echocardiogram of 11-30-22 showed LVEF 35-40%.  Akinesis of the apical 

myocardium.  Hypokinesis of the anteroseptal myocardium.  LA mod to mod dilated.

 Mild MR.  Trivial AoR





HTN





HLD


- statin tx


Plan:





* I called [facility] and spoke with Dr Jang (covering for Dr Méndez). A 

  h/o CAD and cor stenting is reported and he is also reported to have a 

  recently abn stress test


* Given above history and cardiac w/u at our Hospit[facility] in [place], malignant arrhythmia needs to

  be considered; we recommend card cath to eval for CAD as a basis for possible 

  malignant arrhythmia. I discussed this in detail with him and his wife, 

  including rationale, procedure, risks, benefits, potential complications, and 

  alternatives of cath and possible ad hoc cor intervention. They understand. He

  provides informed consent


* Treat with antiplatelet agents, bb and ACE-inhib











DILLAN GRUBER MD FACP FAC CCDS    Dec 1, 2022 17:03

## 2022-12-01 NOTE — DISCHARGE SUMMARY
Diagnosis/Chief Complaint


Date of Admission


Nov 30, 2022 at 01:24


Date of Discharge





Discharge Date:  Dec 1, 2022


Admission Diagnosis


NSTEMI


Primary Care


KemikalWesly 


Discharge Diagnosis





(1) Atrial tachycardia


Status:  Acute


(2) CAD (coronary artery disease)


Status:  Chronic


(3) Essential (primary) hypertension


Status:  Chronic


(4) HLD (hyperlipidemia)


Status:  Chronic


(5) Syncope


Status:  Acute





Discharge Summary


Discharge Physical Exam


Allergies:  


Coded Allergies:  


     No Known Drug Allergies (Unverified , 11/30/22)


Vitals & I&Os





Vital Signs








  Date Time  Temp Pulse Resp B/P (MAP) Pulse Ox O2 Delivery O2 Flow Rate FiO2


 


12/1/22 08:00 36.2 69 16 125/79 (94) 97 Room Air  











Hospital Course


Labs (last 24 hrs)


Laboratory Tests


11/30/22 14:35: Troponin I 0.057H


12/1/22 08:35: 


Sodium Level 141, Potassium Level 4.1, Chloride Level 107, Carbon Dioxide Level 

20L, Anion Gap 14, Blood Urea Nitrogen 15, Creatinine 0.94, Estimat Glomerular 

Filtration Rate 88, BUN/Creatinine Ratio 16, Glucose Level 137H, Calcium Level 

9.8, Magnesium Level 1.8


Patient resulted labs reviewed.


Pending Labs


Laboratory Tests


12/1/22 08:35: 


Sodium Level 141, Potassium Level 4.1, Chloride Level 107, Carbon Dioxide Level 

20, Anion Gap 14, Blood Urea Nitrogen 15, Creatinine 0.94, Estimat Glomerular 

Filtration Rate 88, BUN/Creatinine Ratio 16, Glucose Level 137, Calcium Level 

9.8, Magnesium Level 1.8








Discharge


Home Medications:





Active Scripts


Active


Simvastatin 40 Mg Tablet 40 Mg PO HS 30 Days


     Stop until you have completed paxlovid. Can resume 2 days after it


     finishes.


Reported


Aspirin EC (Aspirin) 81 Mg Tablet.dr 81 Mg PO DAILY


Multivitamin 1 Each Tablet 1 Each PO DAILY


Flomax (Tamsulosin HCl) 0.4 Mg Cap 0.4 Mg PO HS


Carvedilol 12.5 Mg Tablet 12.5 Mg PO BID


Lisinopril 10 Mg Tablet 10 Mg PO DAILY





Instructions to patient/family


Please see electronic discharge instructions given to patient.





Problem Qualifiers





(1) CAD (coronary artery disease):  


Coronary Disease-Associated Artery/Lesion type:  native artery  Native vs. 

transplanted heart:  native heart  Associated angina:  without angina  Qualified

Codes:  I25.10 - Atherosclerotic heart disease of native coronary artery without

angina pectoris


(2) HLD (hyperlipidemia):  


Hyperlipidemia type:  mixed hyperlipidemia  Qualified Codes:  E78.2 - Mixed 

hyperlipidemia


(3) Syncope:  


Syncope type:  unspecified  Qualified Codes:  R55 - Syncope and collapse








GAYLE MELTON MD               Dec 1, 2022 09:18

## 2022-12-01 NOTE — PROGRESS NOTE - HOSPITALIST
Subjective


HPI/CC On Admission


Date Seen by Provider:  Dec 1, 2022


Patient is a 67-year-old  male with past medical history of coronary 

artery disease, hypertension, hyperlipidemia, who presented to the emergency 

department at [facility] due to syncope.  He states he had a

normal day yesterday and was walking in his room and passed out.  He was in the 

bathroom when he woke up sat down and then passed out again.  His wife witnessed

this and states that he was unconscious for less than a minute.  On arrival to 

the emergency department he was found to be quite tachycardic in the 160s.  The 

ER physician there consulted with [place] [facility]ology who recommended digoxin and

adenosine.  He appeared to have an underlying atrial flutter and laboratory 

work-up revealed an elevated troponin.  He was transferred here for further 

evaluation.  On my exam he has no complaints and is in sinus rhythm. He was 

incidentally found to be COVID positive upon transfer but denies any symptoms.


Subjective/Events-last exam


Pt reports doing well. Hopeful to be able to go home today.





Objective


Exam


Vital Signs





Vital Signs








  Date Time  Temp Pulse Resp B/P (MAP) Pulse Ox O2 Delivery O2 Flow Rate FiO2


 


12/1/22 08:00 36.2 69 16 125/79 (94) 97 Room Air  





Capillary Refill :


General Appearance:  No Apparent Distress, WD/WN


Respiratory:  Lungs Clear, No Respiratory Distress


Cardiovascular:  Regular Rate, Rhythm, No Murmur


Gastrointestinal:  Normal Bowel Sounds, Non Tender, Soft


Neurologic/Psychiatric:  Alert, Oriented x3





Results/Procedures


Lab


Laboratory Tests


12/1/22 08:35








Patient resulted labs reviewed.





Assessment/Plan


Assessment and Plan


Assess & Plan/Chief Complaint


NSTEMI


Atrial tachycardia


CAD


HTN


HLD


   Troponin trended down


   ASA and Eliquis


   Cardiology consulted, appreciate recs


   Echo shows EF of 35-40%


   Discussed with SHIMON Stone For Dr Vega and they recommend another night in 

the hospital due to low EF


   Continue home meds as appropriate


   


COVID


   Incidental finding up transfer


   Higher risk given age and comorbidities


   Pt agreeable to Paxlovid


   On room air- not admitted for COVID





BPD


   Flomax





DVT ppx: Eliquis





Diagnosis/Problems


Diagnosis/Problems





(1) Atrial tachycardia


Status:  Acute


(2) CAD (coronary artery disease)


Status:  Chronic


Qualifiers:  


   Coronary Disease-Associated Artery/Lesion type:  native artery  Native vs. 

transplanted heart:  native heart  Associated angina:  without angina  Qualified

Codes:  I25.10 - Atherosclerotic heart disease of native coronary artery without

angina pectoris


(3) Essential (primary) hypertension


Status:  Chronic


(4) HLD (hyperlipidemia)


Status:  Chronic


Qualifiers:  


   Hyperlipidemia type:  mixed hyperlipidemia  Qualified Codes:  E78.2 - Mixed 

hyperlipidemia


(5) Syncope


Status:  Acute


Qualifiers:  


   Syncope type:  unspecified  Qualified Codes:  R55 - Syncope and collapse











GAYLE MELTON MD               Dec 1, 2022 11:44

## 2022-12-01 NOTE — PROGRESS NOTE - CARDIOLOGY
Cardiology SOAP Progress Note


Subjective:


Sitting up in recliner at the bedside


Spouse present


States he feels well


No c/o CP, SOB or palpitations


No further episodes of syncope or near syncope





Objective:


I&O/Vital Signs











 12/1/22 12/1/22 12/1/22 12/1/22





 00:00 01:00 04:14 07:00


 


Temp 37.0  36.3 


 


Pulse 59 50 63 50


 


Resp 15  17 


 


B/P (MAP) 117/76 (90)  124/75 (91) 


 


Pulse Ox 97  98 


 


O2 Delivery Room Air  Room Air 


 


    





 12/1/22 12/1/22  





 08:00 08:00  


 


Temp  36.2  


 


Pulse  69  


 


Resp  16  


 


B/P (MAP)  125/79 (94)  


 


Pulse Ox  97  


 


O2 Delivery Room Air Room Air  














 12/1/22





 00:00


 


Intake Total 730 ml


 


Balance 730 ml








Constitutional:  AAO x 3, well-developed, well-nourished


Respiratory:  No accessory muscle use, No respiratory distress; chest expansion 

is symmetric, chest is bilaterally symmetric, lungs clear to auscultation


Cardiovascular:  regular rate-rhythm; No JVD; S1 and S2


Gastrointestional:  No tender; soft, round, audible bowel sounds


Extremities:  no lower extremity edema bilateral


Neurologic/Psychiatric:  grossly intact (moves all extremities)


Skin:  No rash on exposed areas, No ulcerations on exposed areas





Results/Procedures:


Labs


Laboratory Tests


11/30/22 14:35: Troponin I 0.057H


12/1/22 08:35: 


Sodium Level 141, Potassium Level 4.1, Chloride Level 107, Carbon Dioxide Level 

20L, Anion Gap 14, Blood Urea Nitrogen 15, Creatinine 0.94, Estimat Glomerular 

Filtration Rate 88, BUN/Creatinine Ratio 16, Glucose Level 137H, Calcium Level 

9.8, Magnesium Level 1.8








A/P:


Assessment:


Syncope


- undetermined etiology: arrhythmia vs postural hypotension (during acute Covid)

on 11/29/22





Atrial tachycardia vs atrial flutter with 2:1 AV conduction of EKG of 11-29-22 

at [facility]





Minimal troponin elevation


- likely Type 2 MI secondary to tachycardia





CAD


- h/o stents x 1 at [facility] by Dr. Méndez approx 15 yrs ago





ICM


- Echocardiogram of 11-30-22 showed LVEF 35-40%.  Akinesis of the apical 

myocardium.  Hypokinesis of the anteroseptal myocardium.  LA mod to mod dilated.

 Mild MR.  Trivial AoR





HTN





HLD


- statin tx


Plan:





Syncope x 2 of undetermined etiology


- keep on tele


Atrial tachycardia vs atrial flutter with 2:1 AV conduction


- continue OAC for stroke prophylaxis


Lab


Replace electrolytes as indicated


Request records again from Dr. Méndez


Continue ASA 81 mg daily d/t known h/o CAD with stent in the past


Echocardiogram shows ICM


- restart Coreg but at a lower dose d/t recent syncopal episodes possibly d/t 

bradycardia vs postural hypotension


- restart Lisinopril but at a lower dose for reasons noted above


- adjust medications as tolerated


I have discussed plan of care with patient and spouse.  I have spoken with Dr. Hines as well.











ADRIAN CAMPBELL            Dec 1, 2022 10:25

## 2022-12-01 NOTE — DISCHARGE INST-SIMPLE/STANDARD
Discharge Inst-Standard


Patient Instructions/Follow Up


Plan of Care/Instructions/FU:  


Please continue to take your medications as written. Please follow up with


your primary care doctor to follow up this hospital stay.


Activity as Tolerated:  Yes


Discharge Diet:  Cardiac Diet


Return to The Hospital For:  


Chest pain, shortness of breath, fever, weakness, if you feel you are


getting worse.











GAYLE MELTON MD               Dec 1, 2022 09:19

## 2022-12-02 VITALS — DIASTOLIC BLOOD PRESSURE: 89 MMHG | SYSTOLIC BLOOD PRESSURE: 130 MMHG

## 2022-12-02 VITALS — SYSTOLIC BLOOD PRESSURE: 118 MMHG | DIASTOLIC BLOOD PRESSURE: 74 MMHG

## 2022-12-02 VITALS — SYSTOLIC BLOOD PRESSURE: 121 MMHG | DIASTOLIC BLOOD PRESSURE: 72 MMHG

## 2022-12-02 LAB
BUN/CREAT SERPL: 21
CALCIUM SERPL-MCNC: 9.5 MG/DL (ref 8.5–10.1)
CHLORIDE SERPL-SCNC: 108 MMOL/L (ref 98–107)
CO2 SERPL-SCNC: 18 MMOL/L (ref 21–32)
CREAT SERPL-MCNC: 0.9 MG/DL (ref 0.6–1.3)
GFR SERPLBLD BASED ON 1.73 SQ M-ARVRAT: 93 ML/MIN
GLUCOSE SERPL-MCNC: 121 MG/DL (ref 70–105)
MAGNESIUM SERPL-MCNC: 1.8 MG/DL (ref 1.6–2.4)
POTASSIUM SERPL-SCNC: 3.9 MMOL/L (ref 3.6–5)
SODIUM SERPL-SCNC: 140 MMOL/L (ref 135–145)

## 2022-12-02 RX ADMIN — NIRMATRELVIR AND RITONAVIR SCH EACH: KIT at 09:17

## 2022-12-02 RX ADMIN — NIRMATRELVIR AND RITONAVIR SCH EACH: KIT at 21:19

## 2022-12-02 RX ADMIN — APIXABAN SCH MG: 2.5 TABLET, FILM COATED ORAL at 21:19

## 2022-12-02 RX ADMIN — APIXABAN SCH MG: 2.5 TABLET, FILM COATED ORAL at 09:16

## 2022-12-02 RX ADMIN — ASPIRIN 81 MG CHEWABLE TABLET SCH MG: 81 TABLET CHEWABLE at 09:16

## 2022-12-02 RX ADMIN — Medication SCH EA: at 06:13

## 2022-12-02 NOTE — PROGRESS NOTE - CARDIOLOGY
Cardiology SOAP Progress Note


Subjective:


No cp


Gen weakness and malaise


Shortness of breath with mild to mod activity


No palp


No recurrence of syncope


Mild leg swelling


No n/v/d





Objective:


I&O/Vital Signs











 12/2/22 12/2/22 12/2/22





 07:00 08:00 13:00


 


Pulse 60  57


 


Pulse Ox  94 


 


O2 Delivery  Room Air 














 12/1/22





 23:59


 


Intake Total 2080 ml


 


Balance 2080 ml








Constitutional:  AAO x 3, well-developed, well-nourished


Respiratory:  No accessory muscle use, No respiratory distress; chest expansion 

is symmetric, chest is bilaterally symmetric, lungs clear to auscultation


Cardiovascular:  regular rate-rhythm; No JVD; S1 and S2


Gastrointestional:  No tender; soft, round, audible bowel sounds


Extremities:  no lower extremity edema bilateral


Neurologic/Psychiatric:  grossly intact (moves all extremities)


Skin:  No rash on exposed areas, No ulcerations on exposed areas





Results/Procedures:


Labs


Laboratory Tests


12/2/22 05:18: 


Sodium Level 140, Potassium Level 3.9, Chloride Level 108H, Carbon Dioxide Level

18L, Anion Gap 14, Blood Urea Nitrogen 19H, Creatinine 0.90, Estimat Glomerular 

Filtration Rate 93, BUN/Creatinine Ratio 21, Glucose Level 121H, Calcium Level 

9.5, Magnesium Level 1.8








A/P:


Assessment:


Syncope


- undetermined etiology: arrhythmia vs postural hypotension (during acute Covid)

on 11/29/22


- Echo on 12/1/22: LVEF 35-40%, josé-apical hypokinesis to akinesis


- Card cath on 12/2/22: LVEF 20-25%, global hypokinesis, LVEF 20-25%, LVEDP 10 

mmHg, patent stent in prox LAD, mild CAD





Atrial tachycardia vs atrial flutter with 2:1 AV conduction of EKG of 11-29-22 

at [facility]





Abnormal ECG: NSR with nonspecific IVCD





Minimal troponin elevation


- likely Type 2 MI secondary to tachycardia





CAD


- h/o stents x 1 at [facility] by Dr. Méndez approx 15 yrs ago





ICM


- Echocardiogram of 11-30-22 showed LVEF 35-40%.  Akinesis of the apical 

myocardium.  Hypokinesis of the anteroseptal myocardium.  LA mod to mod dilated.

 Mild MR.  Trivial AoR





HTN





HLD


- statin tx


Plan:





* Treat with antiplatelet agents, bb and ACE-inhib


* Titrate up bb and ACE-inhib


* Add spironolactone, furosemide, and SGLT-2 inhib


* Would need LifeVest prior to d/c


* Discussed in detail with him, his wife, and his son











YASMANIDILLAN BEATTY FACP [facility] CCDS    Dec 2, 2022 16:58

## 2022-12-02 NOTE — CARDIAC CATHETERIZATION
DATE OF SERVICE: 12/02/2022



CARDIAC CATHETERIZATION REPORT



INDICATION:

The patient is a 67-year-old gentleman who was hospitalized with syncope.  

Echocardiography showed dilated cardiomyopathy.  He has a history of coronary 

artery disease and has had stenting by Dr. Méndez several years ago.  At the 

time of hospitalization, troponin was minimally elevated.  Cardiac 

catheterization was recommended.  Informed consent was obtained.



DESCRIPTION OF PROCEDURE:

The patient was brought to the cardiac catheterization laboratory.  Right groin 

was prepped and draped in the usual sterile fashion.  A 1% lidocaine was used 

for local anesthesia.  Modified Seldinger technique was used to advance a 

5-Cameroonian sheath through the right femoral artery.  We carried out angiography of

the right femoral artery through the sheath.  We then used a 6-Cameroonian JL4 

catheter for left coronary angiography and a 6-Cameroonian JR4 catheter for right 

coronary angiography.  We used 5-Cameroonian pigtail catheter for left heart 

catheterization and left ventricular angiography.  At the end of procedure, Mynx

was used to achieve hemostasis following sheath removal.  He tolerated the 

procedure well.



HEMODYNAMICS:

Left ventricular end diastolic pressure following coronary angiography was 10 

mmHg.  There was no significant pressure gradient on pullback across the aortic 

valve.



CORONARY ANGIOGRAPHY:

Left main coronary artery is free of significant disease.  Left anterior 

descending artery has a widely patent stent in its proximal portion.  The left 

anterior descending artery has mild plaque.  Left circumflex artery does not 

exhibit significant disease.  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 

has a high anomalous origin.  It does not exhibit significant disease.



LEFT VENTRICULAR ANGIOGRAPHY:

Left ventricular angiography was carried out in the right anterior oblique 

projection.  Global left ventricular systolic function is markedly impaired.  

Left ventricular ejection fraction is estimated to be 20-25%.



CONCLUSIONS:

1.  Mild coronary artery disease.  There is a widely patent stent in the 

proximal left anterior descending.

2.  Marked impairment in global left ventricular systolic function with global

hypokinesis of left ventricle.  Left ventricular ejection fraction of 20-25%.

3.  Normal left ventricular end diastolic pressure.





Job ID: [id number]

DocumentID: [id number]

Dictated Date: 12/02/2022 16:52:20

Transcription Date: 12/02/2022 21:58:00

Dictated By: DILLAN GRUBER MD; MA; FACP; FACC;

COCO

## 2022-12-02 NOTE — PROGRESS NOTE - HOSPITALIST
Subjective


HPI/CC On Admission


Date Seen by Provider:  Dec 2, 2022


Patient is a 67-year-old  male with past medical history of coronary 

artery disease, hypertension, hyperlipidemia, who presented to the emergency 

department at [facility] due to syncope.  He states he had a

normal day yesterday and was walking in his room and passed out.  He was in the 

bathroom when he woke up sat down and then passed out again.  His wife witnessed

this and states that he was unconscious for less than a minute.  On arrival to 

the emergency department he was found to be quite tachycardic in the 160s.  The 

ER physician there consulted with [place] cardiology who recommended digoxin and

adenosine.  He appeared to have an underlying atrial flutter and laboratory 

work-up revealed an elevated troponin.  He was transferred here for further 

evaluation.  On my exam he has no complaints and is in sinus rhythm. He was 

incidentally found to be COVID positive upon transfer but denies any symptoms.


Subjective/Events-last exam


Pt denies any complaints. Planning for cath later today with Dr Vega.





Objective


Exam


Vital Signs





Vital Signs








  Date Time  Temp Pulse Resp B/P (MAP) Pulse Ox O2 Delivery O2 Flow Rate FiO2


 


12/2/22 04:00  51 16 121/72 (88) 95 Room Air  


 


12/2/22 00:00 36.6       





Capillary Refill :


General Appearance:  No Apparent Distress, WD/WN


Respiratory:  Lungs Clear, No Respiratory Distress


Cardiovascular:  Regular Rate, Rhythm, No Murmur


Neurologic/Psychiatric:  Alert, Oriented x3





Results/Procedures


Lab


Laboratory Tests


12/1/22 08:35








12/2/22 05:18








Patient resulted labs reviewed.





Assessment/Plan


Assessment and Plan


Assess & Plan/Chief Complaint


NSTEMI


Atrial tachycardia


CAD


HTN


HLD


ischemic cardiomyopathy


   ASA and Eliquis


   Cardiology consulted, appreciate recs


   Echo shows EF of 35-40%


   Plan for cath today


   Continue home meds as appropriate


   


COVID


   Incidental finding up transfer


   Higher risk given age and comorbidities


   Pt agreeable to Paxlovid


   On room air- not admitted for COVID





BPD


   Flomax





DVT ppx: Eliquis





Diagnosis/Problems


Diagnosis/Problems





(1) Atrial tachycardia


Status:  Acute


(2) CAD (coronary artery disease)


Status:  Chronic


Qualifiers:  


   Coronary Disease-Associated Artery/Lesion type:  native artery  Native vs. 

transplanted heart:  native heart  Associated angina:  without angina  Qualified

Codes:  I25.10 - Atherosclerotic heart disease of native coronary artery without

angina pectoris


(3) Essential (primary) hypertension


Status:  Chronic


(4) HLD (hyperlipidemia)


Status:  Chronic


Qualifiers:  


   Hyperlipidemia type:  mixed hyperlipidemia  Qualified Codes:  E78.2 - Mixed 

hyperlipidemia


(5) Syncope


Status:  Acute


Qualifiers:  


   Syncope type:  unspecified  Qualified Codes:  R55 - Syncope and collapse











GAYLE MELTON MD               Dec 2, 2022 05:57

## 2022-12-03 VITALS — SYSTOLIC BLOOD PRESSURE: 114 MMHG | DIASTOLIC BLOOD PRESSURE: 85 MMHG

## 2022-12-03 VITALS — SYSTOLIC BLOOD PRESSURE: 116 MMHG | DIASTOLIC BLOOD PRESSURE: 82 MMHG

## 2022-12-03 VITALS — SYSTOLIC BLOOD PRESSURE: 117 MMHG | DIASTOLIC BLOOD PRESSURE: 75 MMHG

## 2022-12-03 VITALS — SYSTOLIC BLOOD PRESSURE: 115 MMHG | DIASTOLIC BLOOD PRESSURE: 73 MMHG

## 2022-12-03 VITALS — DIASTOLIC BLOOD PRESSURE: 73 MMHG | SYSTOLIC BLOOD PRESSURE: 122 MMHG

## 2022-12-03 LAB
BASOPHILS # BLD AUTO: 0.1 10^3/UL (ref 0–0.1)
BASOPHILS NFR BLD AUTO: 1 % (ref 0–10)
BUN/CREAT SERPL: 20
CALCIUM SERPL-MCNC: 9.8 MG/DL (ref 8.5–10.1)
CHLORIDE SERPL-SCNC: 107 MMOL/L (ref 98–107)
CO2 SERPL-SCNC: 20 MMOL/L (ref 21–32)
CREAT SERPL-MCNC: 0.86 MG/DL (ref 0.6–1.3)
EOSINOPHIL # BLD AUTO: 0.2 10^3/UL (ref 0–0.3)
EOSINOPHIL NFR BLD AUTO: 2 % (ref 0–10)
GFR SERPLBLD BASED ON 1.73 SQ M-ARVRAT: 94 ML/MIN
GLUCOSE SERPL-MCNC: 101 MG/DL (ref 70–105)
HCT VFR BLD CALC: 53 % (ref 40–54)
HGB BLD-MCNC: 17.8 G/DL (ref 13.3–17.7)
LYMPHOCYTES # BLD AUTO: 2.2 10^3/UL (ref 1–4)
LYMPHOCYTES NFR BLD AUTO: 18 % (ref 12–44)
MAGNESIUM SERPL-MCNC: 1.7 MG/DL (ref 1.6–2.4)
MANUAL DIFFERENTIAL PERFORMED BLD QL: NO
MCH RBC QN AUTO: 31 PG (ref 25–34)
MCHC RBC AUTO-ENTMCNC: 34 G/DL (ref 32–36)
MCV RBC AUTO: 91 FL (ref 80–99)
MONOCYTES # BLD AUTO: 1.1 10^3/UL (ref 0–1)
MONOCYTES NFR BLD AUTO: 9 % (ref 0–12)
NEUTROPHILS # BLD AUTO: 8.7 10^3/UL (ref 1.8–7.8)
NEUTROPHILS NFR BLD AUTO: 71 % (ref 42–75)
PLATELET # BLD: 190 10^3/UL (ref 130–400)
PMV BLD AUTO: 10.1 FL (ref 9–12.2)
POTASSIUM SERPL-SCNC: 3.8 MMOL/L (ref 3.6–5)
SODIUM SERPL-SCNC: 140 MMOL/L (ref 135–145)
WBC # BLD AUTO: 12.3 10^3/UL (ref 4.3–11)

## 2022-12-03 RX ADMIN — APIXABAN SCH MG: 2.5 TABLET, FILM COATED ORAL at 11:41

## 2022-12-03 RX ADMIN — NIRMATRELVIR AND RITONAVIR SCH EACH: KIT at 11:41

## 2022-12-03 RX ADMIN — Medication SCH EA: at 06:23

## 2022-12-03 RX ADMIN — ASPIRIN 81 MG CHEWABLE TABLET SCH MG: 81 TABLET CHEWABLE at 11:39

## 2022-12-03 NOTE — PROGRESS NOTE - CARDIOLOGY
Cardiology SOAP Progress Note


Subjective:


No recurrence of syncope since admission


Shortness of breath better


No n/v/d


Gen weakness better


No focal weakness 


No groin or leg discomfort or weakness


No swelling


Wishes to go home





Objective:


I&O/Vital Signs











 12/3/22 12/3/22 12/3/22 12/3/22





 07:00 08:00 08:00 12:00


 


Temp  36.0  36.4


 


Pulse 78 16  81


 


Resp  16  20


 


B/P (MAP)  114/85 (95)  122/73 (89)


 


Pulse Ox  98 95 95


 


O2 Delivery  Room Air Room Air Room Air


 


    





 12/3/22   





 12:47   


 


Pulse 68   














 12/3/22





 00:00


 


Intake Total 940 ml


 


Balance 940 ml








Constitutional:  AAO x 3, well-developed, well-nourished


Respiratory:  No accessory muscle use, No respiratory distress; chest expansion 

is symmetric, chest is bilaterally symmetric, lungs clear to auscultation


Cardiovascular:  regular rate-rhythm; No JVD; S1 and S2


Gastrointestional:  No tender; soft, round, audible bowel sounds


Extremities:  no lower extremity edema bilateral


Neurologic/Psychiatric:  grossly intact (moves all extremities)


Skin:  No rash on exposed areas, No ulcerations on exposed areas





Results/Procedures:


Labs


Laboratory Tests


12/3/22 05:34: 


White Blood Count 12.3H, Red Blood Count 5.76H, Hemoglobin 17.8H, Hematocrit 53,

Mean Corpuscular Volume 91, Mean Corpuscular Hemoglobin 31, Mean Corpuscular 

Hemoglobin Concent 34, Red Cell Distribution Width 12.9, Platelet Count 190, 

Mean Platelet Volume 10.1, Immature Granulocyte % (Auto) 0, Neutrophils (%) 

(Auto) 71, Lymphocytes (%) (Auto) 18, Monocytes (%) (Auto) 9, Eosinophils (%) 

(Auto) 2, Basophils (%) (Auto) 1, Neutrophils # (Auto) 8.7H, Lymphocytes # (Au

to) 2.2, Monocytes # (Auto) 1.1H, Eosinophils # (Auto) 0.2, Basophils # (Auto) 

0.1, Immature Granulocyte # (Auto) 0.0, Sodium Level 140, Potassium Level 3.8, 

Chloride Level 107, Carbon Dioxide Level 20L, Anion Gap 13, Blood Urea Nitrogen 

17, Creatinine 0.86, Estimat Glomerular Filtration Rate 94, BUN/Creatinine Ratio

20, Glucose Level 101, Calcium Level 9.8, Magnesium Level 1.7








A/P:


Assessment:


Syncope in the setting or dilated cardiomyopathy


- undetermined etiology: arrhythmia vs postural hypotension (during acute Covid)

on 11/29/22


- Echo on 12/1/22: LVEF 35-40%, josé-apical hypokinesis to akinesis


- Card cath on 12/2/22: LVEF 20-25%, global hypokinesis, LVEF 20-25%, LVEDP 10 

mmHg, patent stent in prox LAD, mild CAD


- LifeVest placed on 12/3/22





Atrial tachycardia on EKG of 11-29-22 at [facility], transient, no recurrence





Abnormal ECG: NSR with nonspecific IVCD





Minimal troponin elevation


- likely Type 2 MI secondary to dilated cardiomyopathy and chronic systolic CHF





CAD


- h/o stents x 1 at [facility] by Dr. Méndez approx 15 yrs ago





Dilated cardiomyopathy


- Echocardiogram of 11-30-22 showed LVEF 35-40%.  Akinesis of the apical 

myocardium.  Hypokinesis of the anteroseptal myocardium.  LA mod to mod dilated.

 Mild MR.  Trivial AoR





HTN





HLD


- statin tx


Plan:





* Discussed in detail with him and his wife


* Continue current heart failure meds


* LifeVest placed


* Close outpt f/u advised. Labs and f/u next week


* Sleep studies advised that he will pursue as an outpt











DILLAN GRUBER MD FACP [facility] CCDS    Dec 3, 2022 16:25

## 2022-12-03 NOTE — DISCHARGE INST-CARDIOLOGY
Discharge Inst-Cardiac


Discharge Medications


New Medications:  


Aspirin (Children's Aspirin) 81 Mg Tab.chew


81 MG PO DAILY for 30 Days, #30 TAB 3 Refills





Empagliflozin (Jardiance) 10 Mg Tablet


10 MG PO DAILY for 30 Days, #30 TAB 3 Refills





Furosemide (Furosemide) 40 Mg Tablet


40 MG PO DAILY for 30 Days, #30 TAB 3 Refills





Spironolactone (Spironolactone) 25 Mg Tablet


25 MG PO DAILY for 30 Days, #30 TAB 3 Refills





 


Changed Medications:  


Simvastatin (Simvastatin) 40 Mg Tablet


40 MG PO HS for 30 Days, TAB (Medication details modified)


Stop until you have completed paxlovid. Can resume 2 days after it


 finishes.


 


Continued Medications:  


Carvedilol (Carvedilol) 12.5 Mg Tablet


12.5 MG PO BID, TAB





Lisinopril (Lisinopril) 10 Mg Tablet


10 MG PO DAILY, TAB





Multivitamin (Multivitamin) 1 Each Tablet


1 EACH PO DAILY, TAB





Tamsulosin HCl (Flomax) 0.4 Mg Cap


0.4 MG PO HS, CAP





 


Discontinued Medications:  


Aspirin (Aspirin EC) 81 Mg Tablet.dr


81 MG PO DAILY, TAB











Patient Instructions


Patient Instructions:  


F/u with Dr Vega in one week


BMP and magnesium level next week





Activity & Diet


Discharge Diet:  Cardiac Diet


Activity as Tolerated:  Yes











DILLAN VEGA [facility]    Dec 3, 2022 16:19

## 2022-12-03 NOTE — DISCHARGE SUMMARY
Diagnosis/Chief Complaint


Date of Admission


Nov 30, 2022 at 1:24 am


Date of Discharge





Discharge Date:  Dec 1, 2022


Admission Diagnosis


NSTEMI


Primary Care


Wesly Juares DO


Discharge Diagnosis





(1) Atrial tachycardia


Status:  Acute


(2) CAD (coronary artery disease)


Status:  Chronic


(3) Essential (primary) hypertension


Status:  Chronic


(4) HLD (hyperlipidemia)


Status:  Chronic


(5) Syncope


Status:  Acute





Discharge Summary


Discharge Physical Exam


Allergies:  


Coded Allergies:  


     No Known Drug Allergies (Unverified , 11/30/22)


Vitals & I&Os





Vital Signs








  Date Time  Temp Pulse Resp B/P (MAP) Pulse Ox O2 Delivery O2 Flow Rate FiO2


 


12/3/22 07:00  78      


 


12/3/22 03:30 36.0  16 115/73 (87) 96 Room Air  








General Appearance:  No Apparent Distress, WD/WN


Respiratory:  Lungs Clear, No Respiratory Distress


Cardiovascular:  Regular Rate, Rhythm, No Murmur


Neurologic/Psychiatric:  Alert, Oriented x3





Hospital Course


Patient was admitted to the hospital secondary to syncope and elevated troponin.

 He was found to have an atrial tachycardia concerning for underlying atrial 

flutter.  He was seen by cardiology started on Eliquis for stroke prophylaxis.  

He did have an echo done which revealed an EF around 30% and thus cardiac cath 

was done.  No interventions are warranted.  He was started on guideline directed

heart failure therapy and a LifeVest was fitted and placed.  He was discharged 

home to follow-up with his primary care physician Dr. Martin and to establish 

cardiology care with Dr. Jiang here.  He was incidentally found to have COVID 

and was treated with Paxlovid.  This was continued upon discharge.


Labs (last 24 hrs)


Laboratory Tests


12/3/22 05:34: 


White Blood Count 12.3H, Red Blood Count 5.76H, Hemoglobin 17.8H, Hematocrit 53,

Mean Corpuscular Volume 91, Mean Corpuscular Hemoglobin 31, Mean Corpuscular 

Hemoglobin Concent 34, Red Cell Distribution Width 12.9, Platelet Count 190, 

Mean Platelet Volume 10.1, Immature Granulocyte % (Auto) 0, Neutrophils (%) 

(Auto) 71, Lymphocytes (%) (Auto) 18, Monocytes (%) (Auto) 9, Eosinophils (%) 

(Auto) 2, Basophils (%) (Auto) 1, Neutrophils # (Auto) 8.7H, Lymphocytes # 

(Auto) 2.2, Monocytes # (Auto) 1.1H, Eosinophils # (Auto) 0.2, Basophils # 

(Auto) 0.1, Immature Granulocyte # (Auto) 0.0, Sodium Level 140, Potassium Level

3.8, Chloride Level 107, Carbon Dioxide Level 20L, Anion Gap 13, Blood Urea 

Nitrogen 17, Creatinine 0.86, Estimat Glomerular Filtration Rate 94, 

BUN/Creatinine Ratio 20, Glucose Level 101, Calcium Level 9.8, Magnesium Level 

1.7


Patient resulted labs reviewed.


Pending Labs


Laboratory Tests


12/3/22 05:34: 


White Blood Count 12.3, Red Blood Count 5.76, Hemoglobin 17.8, Hematocrit 53, 

Mean Corpuscular Volume 91, Mean Corpuscular Hemoglobin 31, Mean Corpuscular 

Hemoglobin Concent 34, Red Cell Distribution Width 12.9, Platelet Count 190, 

Mean Platelet Volume 10.1, Immature Granulocyte % (Auto) 0, Neutrophils (%) 

(Auto) 71, Lymphocytes (%) (Auto) 18, Monocytes (%) (Auto) 9, Eosinophils (%) 

(Auto) 2, Basophils (%) (Auto) 1, Neutrophils # (Auto) 8.7, Lymphocytes # (Auto)

2.2, Monocytes # (Auto) 1.1, Eosinophils # (Auto) 0.2, Basophils # (Auto) 0.1, 

Immature Granulocyte # (Auto) 0.0, Sodium Level 140, Potassium Level 3.8, 

Chloride Level 107, Carbon Dioxide Level 20, Anion Gap 13, Blood Urea Nitrogen 

17, Creatinine 0.86, Estimat Glomerular Filtration Rate 94, BUN/Creatinine Ratio

20, Glucose Level 101, Calcium Level 9.8, Magnesium Level 1.7








Discussion & Recommendations


Discharge Planning:  >30 minutes discharge planning





Discharge


Home Medications:





Active Scripts


Active


Simvastatin 40 Mg Tablet 40 Mg PO HS 30 Days


     Stop until you have completed paxlovid. Can resume 2 days after it


     finishes.


Reported


Aspirin EC (Aspirin) 81 Mg Tablet.dr 81 Mg PO DAILY


Multivitamin 1 Each Tablet 1 Each PO DAILY


Flomax (Tamsulosin HCl) 0.4 Mg Cap 0.4 Mg PO HS


Carvedilol 12.5 Mg Tablet 12.5 Mg PO BID


Lisinopril 10 Mg Tablet 10 Mg PO DAILY





Instructions to patient/family


Please see electronic discharge instructions given to patient.





Problem Qualifiers





(1) CAD (coronary artery disease):  


Coronary Disease-Associated Artery/Lesion type:  native artery  Native vs. 

transplanted heart:  native heart  Associated angina:  without angina  Qualified

Codes:  I25.10 - Atherosclerotic heart disease of native coronary artery without

angina pectoris


(2) HLD (hyperlipidemia):  


Hyperlipidemia type:  mixed hyperlipidemia  Qualified Codes:  E78.2 - Mixed 

hyperlipidemia


(3) Syncope:  


Syncope type:  unspecified  Qualified Codes:  R55 - Syncope and collapse








GAYLE MELTON MD              Dec 3, 2022 8:59 am

## 2023-02-08 ENCOUNTER — HOSPITAL ENCOUNTER (OUTPATIENT)
Dept: HOSPITAL 75 - SLEEP | Age: 68
End: 2023-02-08
Attending: NURSE PRACTITIONER
Payer: MEDICARE

## 2023-02-08 DIAGNOSIS — I25.9: Primary | ICD-10-CM

## 2023-02-08 DIAGNOSIS — R06.83: ICD-10-CM

## 2023-02-08 DIAGNOSIS — G47.10: ICD-10-CM

## 2023-03-01 ENCOUNTER — HOSPITAL ENCOUNTER (OUTPATIENT)
Dept: HOSPITAL 75 - CARD | Age: 68
End: 2023-03-01
Attending: INTERNAL MEDICINE
Payer: MEDICARE

## 2023-03-01 DIAGNOSIS — I42.0: ICD-10-CM

## 2023-03-01 DIAGNOSIS — I51.7: Primary | ICD-10-CM

## 2023-03-01 PROCEDURE — 93306 TTE W/DOPPLER COMPLETE: CPT

## 2023-03-30 ENCOUNTER — HOSPITAL ENCOUNTER (OUTPATIENT)
Dept: HOSPITAL 75 - CARD | Age: 68
End: 2023-03-30
Attending: INTERNAL MEDICINE
Payer: MEDICARE

## 2023-03-30 DIAGNOSIS — I51.89: Primary | ICD-10-CM

## 2023-03-30 PROCEDURE — 78472 GATED HEART PLANAR SINGLE: CPT

## 2023-04-05 NOTE — STRESS TEST
DATE OF SERVICE: 03/30/2023



RADIONUCLIDE VENTRICULOGRAPHY



ORDERING PHYSICIAN:

Dr. Vega.



PRIMARY PHYSICIAN:

Dr. Juares.



CLINICAL DIAGNOSIS:

Dilated cardiomyopathy.



Radionuclide ventriculography was carried out with autologous red blood cell 

tagged with 32.5 mCi of technetium-99m.  Gated images show anteroapical 

akinesis.  Left ventricular ejection fraction is calculated to be 34%.



CONCLUSIONS:

1.  Anteroapical akinesis.

2.  Left ventricular ejection fraction 34%.





Job ID: [id number]

DocumentID: [id number]

Dictated Date: 04/05/2023 09:55:48

Transcription Date: 04/05/2023 12:12:00

Dictated By: DILLAN VEGA MD; MA; FACP; FACC;